# Patient Record
Sex: MALE | Race: WHITE | Employment: OTHER | URBAN - METROPOLITAN AREA
[De-identification: names, ages, dates, MRNs, and addresses within clinical notes are randomized per-mention and may not be internally consistent; named-entity substitution may affect disease eponyms.]

---

## 2018-12-18 ENCOUNTER — HOSPITAL ENCOUNTER (OUTPATIENT)
Age: 68
Discharge: HOME | End: 2018-12-18
Payer: MEDICARE

## 2018-12-18 LAB
BUN BLDV-MCNC: 14 MG/DL (ref 8–23)
CREAT SERPL-MCNC: 1.1 MG/DL (ref 0.7–1.2)
GFR AFRICAN AMERICAN: >60
GFR NON-AFRICAN AMERICAN: >60 ML/MIN/1.73

## 2018-12-18 PROCEDURE — 82565 ASSAY OF CREATININE: CPT

## 2018-12-18 PROCEDURE — 36415 COLL VENOUS BLD VENIPUNCTURE: CPT

## 2018-12-18 PROCEDURE — 84520 ASSAY OF UREA NITROGEN: CPT

## 2024-12-19 ENCOUNTER — APPOINTMENT (OUTPATIENT)
Dept: RADIOLOGY | Facility: HOSPITAL | Age: 74
End: 2024-12-19
Payer: MEDICARE

## 2024-12-19 ENCOUNTER — HOSPITAL ENCOUNTER (EMERGENCY)
Facility: HOSPITAL | Age: 74
Discharge: HOME | End: 2024-12-19
Attending: EMERGENCY MEDICINE
Payer: MEDICARE

## 2024-12-19 VITALS
WEIGHT: 250 LBS | HEIGHT: 70 IN | TEMPERATURE: 97.5 F | RESPIRATION RATE: 16 BRPM | DIASTOLIC BLOOD PRESSURE: 78 MMHG | BODY MASS INDEX: 35.79 KG/M2 | OXYGEN SATURATION: 100 % | HEART RATE: 78 BPM | SYSTOLIC BLOOD PRESSURE: 143 MMHG

## 2024-12-19 DIAGNOSIS — K92.2 GASTROINTESTINAL HEMORRHAGE, UNSPECIFIED GASTROINTESTINAL HEMORRHAGE TYPE: Primary | ICD-10-CM

## 2024-12-19 LAB
ABO GROUP (TYPE) IN BLOOD: NORMAL
ALBUMIN SERPL BCP-MCNC: 4 G/DL (ref 3.4–5)
ALP SERPL-CCNC: 98 U/L (ref 33–136)
ALT SERPL W P-5'-P-CCNC: 15 U/L (ref 10–52)
ANION GAP SERPL CALC-SCNC: 19 MMOL/L (ref 10–20)
ANTIBODY SCREEN: NORMAL
AST SERPL W P-5'-P-CCNC: 34 U/L (ref 9–39)
BASOPHILS # BLD AUTO: 0.02 X10*3/UL (ref 0–0.1)
BASOPHILS NFR BLD AUTO: 0.2 %
BILIRUB SERPL-MCNC: 0.8 MG/DL (ref 0–1.2)
BUN SERPL-MCNC: 22 MG/DL (ref 6–23)
CALCIUM SERPL-MCNC: 8.8 MG/DL (ref 8.6–10.3)
CHLORIDE SERPL-SCNC: 103 MMOL/L (ref 98–107)
CO2 SERPL-SCNC: 20 MMOL/L (ref 21–32)
CREAT SERPL-MCNC: 1.35 MG/DL (ref 0.5–1.3)
EGFRCR SERPLBLD CKD-EPI 2021: 55 ML/MIN/1.73M*2
EOSINOPHIL # BLD AUTO: 0 X10*3/UL (ref 0–0.4)
EOSINOPHIL NFR BLD AUTO: 0 %
ERYTHROCYTE [DISTWIDTH] IN BLOOD BY AUTOMATED COUNT: 13 % (ref 11.5–14.5)
GLUCOSE SERPL-MCNC: 75 MG/DL (ref 74–99)
HCT VFR BLD AUTO: 37.2 % (ref 41–52)
HCT VFR BLD AUTO: 38.8 % (ref 41–52)
HGB BLD-MCNC: 12.3 G/DL (ref 13.5–17.5)
HGB BLD-MCNC: 12.9 G/DL (ref 13.5–17.5)
IMM GRANULOCYTES # BLD AUTO: 0.03 X10*3/UL (ref 0–0.5)
IMM GRANULOCYTES NFR BLD AUTO: 0.3 % (ref 0–0.9)
LACTATE SERPL-SCNC: 0.8 MMOL/L (ref 0.4–2)
LYMPHOCYTES # BLD AUTO: 0.8 X10*3/UL (ref 0.8–3)
LYMPHOCYTES NFR BLD AUTO: 9.2 %
MCH RBC QN AUTO: 30.6 PG (ref 26–34)
MCHC RBC AUTO-ENTMCNC: 33.2 G/DL (ref 32–36)
MCV RBC AUTO: 92 FL (ref 80–100)
MONOCYTES # BLD AUTO: 0.57 X10*3/UL (ref 0.05–0.8)
MONOCYTES NFR BLD AUTO: 6.6 %
NEUTROPHILS # BLD AUTO: 7.26 X10*3/UL (ref 1.6–5.5)
NEUTROPHILS NFR BLD AUTO: 83.7 %
NRBC BLD-RTO: 0 /100 WBCS (ref 0–0)
PLATELET # BLD AUTO: 201 X10*3/UL (ref 150–450)
POTASSIUM SERPL-SCNC: 4.5 MMOL/L (ref 3.5–5.3)
PROT SERPL-MCNC: 7.1 G/DL (ref 6.4–8.2)
RBC # BLD AUTO: 4.22 X10*6/UL (ref 4.5–5.9)
RH FACTOR (ANTIGEN D): NORMAL
SODIUM SERPL-SCNC: 137 MMOL/L (ref 136–145)
WBC # BLD AUTO: 8.7 X10*3/UL (ref 4.4–11.3)

## 2024-12-19 PROCEDURE — 74174 CTA ABD&PLVS W/CONTRAST: CPT

## 2024-12-19 PROCEDURE — 83605 ASSAY OF LACTIC ACID: CPT | Performed by: EMERGENCY MEDICINE

## 2024-12-19 PROCEDURE — 96374 THER/PROPH/DIAG INJ IV PUSH: CPT | Mod: 59

## 2024-12-19 PROCEDURE — 85014 HEMATOCRIT: CPT | Mod: 59 | Performed by: EMERGENCY MEDICINE

## 2024-12-19 PROCEDURE — 85025 COMPLETE CBC W/AUTO DIFF WBC: CPT | Performed by: EMERGENCY MEDICINE

## 2024-12-19 PROCEDURE — 2550000001 HC RX 255 CONTRASTS: Performed by: EMERGENCY MEDICINE

## 2024-12-19 PROCEDURE — 86900 BLOOD TYPING SEROLOGIC ABO: CPT | Performed by: EMERGENCY MEDICINE

## 2024-12-19 PROCEDURE — 99285 EMERGENCY DEPT VISIT HI MDM: CPT | Mod: 25 | Performed by: EMERGENCY MEDICINE

## 2024-12-19 PROCEDURE — 2500000004 HC RX 250 GENERAL PHARMACY W/ HCPCS (ALT 636 FOR OP/ED): Performed by: EMERGENCY MEDICINE

## 2024-12-19 PROCEDURE — 80053 COMPREHEN METABOLIC PANEL: CPT | Performed by: EMERGENCY MEDICINE

## 2024-12-19 PROCEDURE — 36415 COLL VENOUS BLD VENIPUNCTURE: CPT | Performed by: EMERGENCY MEDICINE

## 2024-12-19 PROCEDURE — 74174 CTA ABD&PLVS W/CONTRAST: CPT | Performed by: RADIOLOGY

## 2024-12-19 RX ORDER — BISMUTH SUBSALICYLATE 262 MG
1 TABLET,CHEWABLE ORAL DAILY
COMMUNITY

## 2024-12-19 RX ORDER — OMEPRAZOLE 40 MG/1
40 CAPSULE, DELAYED RELEASE ORAL EVERY MORNING
COMMUNITY

## 2024-12-19 RX ORDER — PANTOPRAZOLE SODIUM 40 MG/10ML
80 INJECTION, POWDER, LYOPHILIZED, FOR SOLUTION INTRAVENOUS ONCE
Status: COMPLETED | OUTPATIENT
Start: 2024-12-19 | End: 2024-12-19

## 2024-12-19 RX ORDER — TAMSULOSIN HYDROCHLORIDE 0.4 MG/1
0.8 CAPSULE ORAL DAILY
COMMUNITY

## 2024-12-19 RX ORDER — HYDROCORTISONE 25 MG/G
1 CREAM TOPICAL 2 TIMES DAILY
Qty: 6 G | Refills: 0 | Status: SHIPPED | OUTPATIENT
Start: 2024-12-19 | End: 2025-01-18

## 2024-12-19 RX ORDER — VIT C/E/ZN/COPPR/LUTEIN/ZEAXAN 250MG-90MG
25 CAPSULE ORAL DAILY
COMMUNITY

## 2024-12-19 RX ORDER — DILTIAZEM HYDROCHLORIDE 240 MG/1
240 CAPSULE, COATED, EXTENDED RELEASE ORAL DAILY
COMMUNITY

## 2024-12-19 RX ADMIN — IOHEXOL 90 ML: 350 INJECTION, SOLUTION INTRAVENOUS at 12:52

## 2024-12-19 RX ADMIN — PANTOPRAZOLE SODIUM 80 MG: 40 INJECTION, POWDER, FOR SOLUTION INTRAVENOUS at 11:26

## 2024-12-19 ASSESSMENT — COLUMBIA-SUICIDE SEVERITY RATING SCALE - C-SSRS
2. HAVE YOU ACTUALLY HAD ANY THOUGHTS OF KILLING YOURSELF?: NO
1. IN THE PAST MONTH, HAVE YOU WISHED YOU WERE DEAD OR WISHED YOU COULD GO TO SLEEP AND NOT WAKE UP?: NO
6. HAVE YOU EVER DONE ANYTHING, STARTED TO DO ANYTHING, OR PREPARED TO DO ANYTHING TO END YOUR LIFE?: NO

## 2024-12-19 ASSESSMENT — PAIN DESCRIPTION - DESCRIPTORS: DESCRIPTORS: ACHING

## 2024-12-19 ASSESSMENT — LIFESTYLE VARIABLES
HAVE YOU EVER FELT YOU SHOULD CUT DOWN ON YOUR DRINKING: NO
EVER FELT BAD OR GUILTY ABOUT YOUR DRINKING: NO
HAVE PEOPLE ANNOYED YOU BY CRITICIZING YOUR DRINKING: NO
EVER HAD A DRINK FIRST THING IN THE MORNING TO STEADY YOUR NERVES TO GET RID OF A HANGOVER: NO
TOTAL SCORE: 0

## 2024-12-19 ASSESSMENT — PAIN DESCRIPTION - PAIN TYPE: TYPE: ACUTE PAIN

## 2024-12-19 ASSESSMENT — PAIN SCALES - GENERAL: PAINLEVEL_OUTOF10: 4

## 2024-12-19 ASSESSMENT — PAIN - FUNCTIONAL ASSESSMENT: PAIN_FUNCTIONAL_ASSESSMENT: 0-10

## 2024-12-19 ASSESSMENT — PAIN DESCRIPTION - LOCATION: LOCATION: ABDOMEN

## 2024-12-19 ASSESSMENT — ACTIVITIES OF DAILY LIVING (ADL): LACK_OF_TRANSPORTATION: NO

## 2024-12-19 NOTE — PROGRESS NOTES
Pharmacy Medication History Review    Phan Gandara is a 74 y.o. male admitted for No Principal Problem: There is no principal problem currently on the Problem List. Please update the Problem List and refresh.. Pharmacy reviewed the patient's [id number] medications and allergies for accuracy.    The list below reflectives the updated PTA list. Please review each medication in order reconciliation for additional clarification and justification.  Prior to Admission Medications   Prescriptions Last Dose Informant Patient Reported? Taking?   CYANOCOBALAMIN, VITAMIN B-12, ORAL Past Week Self Yes Yes   Sig: Place 1 tablet under the tongue once daily.   cholecalciferol (Vitamin D-3) 25 MCG (1000 UT) capsule Past Week Self Yes Yes   Sig: Take 1 capsule (25 mcg) by mouth once daily.   dilTIAZem CD (Cardizem CD) 240 mg 24 hr capsule 12/19/2024 Morning Self Yes Yes   Sig: Take 1 capsule (240 mg) by mouth once daily.   lubricating eye drops ophthalmic solution 12/18/2024 Evening Self Yes Yes   Sig: Administer 1 drop into both eyes 4 times a day as needed for dry eyes.   multivitamin tablet Past Week Self Yes Yes   Sig: Take 1 tablet by mouth once daily.   omeprazole (PriLOSEC) 40 mg DR capsule 12/19/2024 Morning Self Yes Yes   Sig: Take 1 capsule (40 mg) by mouth once daily in the morning. Do not crush or chew.   tamsulosin (Flomax) 0.4 mg 24 hr capsule 12/18/2024 Evening Self Yes Yes   Sig: Take 2 capsules (0.8 mg) by mouth once daily.      Facility-Administered Medications: None           The list below reflectives the updated allergy list. Please review each documented allergy for additional clarification and justification.  Allergies  Reviewed by Noah Benavidez RN on 12/19/2024   No Known Allergies         Below are additional concerns with the patient's PTA list.      Christy Cerna

## 2024-12-19 NOTE — PROGRESS NOTES
12/19/24 1447   Discharge Planning   Living Arrangements Alone  (sister lives close)   Support Systems Family members   Assistance Needed A&OX4; independent with ADLs with no DME; drives; room air baseline and currently room air; PCP Dr Murry   Type of Residence Private residence   Number of Stairs to Enter Residence 5   Number of Stairs Within Residence 14   Do you have animals or pets at home? Yes   Type of Animals or Pets 1 dog   Who is requesting discharge planning? Provider   Home or Post Acute Services None   Expected Discharge Disposition Home  (Home no needs. Patient to follow up at outpatient lab for repeat labs. Pt to follow up with PCP ASAP. Pt to call and arrange. Currently has 1/20/25 with his PCP)   Does the patient need discharge transport arranged? No   Financial Resource Strain   How hard is it for you to pay for the very basics like food, housing, medical care, and heating? Not hard   Housing Stability   In the last 12 months, was there a time when you were not able to pay the mortgage or rent on time? N   In the past 12 months, how many times have you moved where you were living? 0   At any time in the past 12 months, were you homeless or living in a shelter (including now)? N   Transportation Needs   In the past 12 months, has lack of transportation kept you from medical appointments or from getting medications? no   In the past 12 months, has lack of transportation kept you from meetings, work, or from getting things needed for daily living? No   Intensity of Service   Intensity of Service 0-30 min     12/19/2024 1452pm  Spoke with patient bedside in ED

## 2024-12-19 NOTE — ED PROVIDER NOTES
HPI   Chief Complaint   Patient presents with    Black or Bloody Stool       74-year-old male from home for chief complaint of GI bleeding.  Patient states on Monday he had black and maroon stool with no belly pain.  He saw his doctor Tuesday morning who told him do a clear liquid diet for 24 hours and see what happens.  He states that let up and then again this morning had a bunch of black tarry stool.  He states this happened once 5 years ago when he was hospitalized down at [facility], they could not find the source of the bleed.  He does not take NSAIDs he does not take blood thinners.  He denies any dizziness lightheadedness chest pain or shortness of breath.                  Patient History   History reviewed. No pertinent past medical history.  History reviewed. No pertinent surgical history.  No family history on file.  Social History     Tobacco Use    Smoking status: Never    Smokeless tobacco: Never   Substance Use Topics    Alcohol use: Not Currently    Drug use: Never       Physical Exam   ED Triage Vitals [12/19/24 1041]   Temperature Heart Rate Respirations BP   36.4 °C (97.5 °F) 81 20 162/90      Pulse Ox Temp Source Heart Rate Source Patient Position   95 % Temporal Monitor --      BP Location FiO2 (%)     -- --       Physical Exam  Vitals and nursing note reviewed.   Constitutional:       Appearance: Normal appearance.   HENT:      Head: Normocephalic and atraumatic.      Nose: Nose normal.      Mouth/Throat:      Mouth: Mucous membranes are moist.   Eyes:      Extraocular Movements: Extraocular movements intact.      Pupils: Pupils are equal, round, and reactive to light.   Cardiovascular:      Rate and Rhythm: Normal rate and regular rhythm.   Pulmonary:      Effort: Pulmonary effort is normal.      Breath sounds: Normal breath sounds.   Abdominal:      General: Abdomen is flat.      Palpations: Abdomen is soft.   Musculoskeletal:         General: Normal range of motion.      Cervical back: Normal  range of motion.   Skin:     General: Skin is warm and dry.      Capillary Refill: Capillary refill takes less than 2 seconds.   Neurological:      General: No focal deficit present.      Mental Status: He is alert.   Psychiatric:         Mood and Affect: Mood normal.           ED Course & MDM   ED Course as of 12/26/24 2237   u Dec 19, 2024   1136 HEMOGLOBIN(!): 12.9 [TP]   1136 HEMATOCRIT(!): 38.8 [TP]      ED Course User Index  [TP] Tamika De Los Santos DO         Diagnoses as of 12/26/24 2237   Gastrointestinal hemorrhage, unspecified gastrointestinal hemorrhage type                 No data recorded     [place] Coma Scale Score: 15 (12/19/24 1042 : Noah Benavidez RN)                           Medical Decision Making  Medical Decision Making: Patient was worked up and has a hemoglobin of 12.9.  Rest of the lab work is at baseline, CT angiogram does not show any evidence of GI bleeding.  We did have the GI nurse practitioner come down to see the patient.  At this time we do feel this is most likely hemorrhoidal in nature.  Patient does feel comfortable going home with very close follow-up.  He does agree the plan of Care.  Did encourage a high-fiber diet at this time.  [unfilled]   Differential includes GI bleed upper GI bleed lower hemorrhoids anal fissure  Consider repeat H&H which was done, hemoglobin is stable at this time.  Tamika De Los Santos D.O.  Emergency Medicine          Procedure  Procedures     Tamika De Los Santos DO  12/26/24 2238

## 2024-12-19 NOTE — ED TRIAGE NOTES
Patient c/o bloody stools for the past 4 days, patient states it started out has bright red bloody diarrhea, last night his stools became dark black. Patient states he's had some lower abdominal pain and cramping, denies nausea or vomiting, patient is not on blood thinners.

## 2025-04-25 ENCOUNTER — APPOINTMENT (OUTPATIENT)
Dept: RADIOLOGY | Facility: HOSPITAL | Age: 75
End: 2025-04-25
Payer: MEDICARE

## 2025-04-25 ENCOUNTER — HOSPITAL ENCOUNTER (EMERGENCY)
Facility: HOSPITAL | Age: 75
Discharge: HOME | End: 2025-04-25
Attending: STUDENT IN AN ORGANIZED HEALTH CARE EDUCATION/TRAINING PROGRAM
Payer: MEDICARE

## 2025-04-25 VITALS
WEIGHT: 250 LBS | SYSTOLIC BLOOD PRESSURE: 156 MMHG | BODY MASS INDEX: 35.79 KG/M2 | TEMPERATURE: 97.2 F | HEART RATE: 58 BPM | RESPIRATION RATE: 18 BRPM | OXYGEN SATURATION: 98 % | DIASTOLIC BLOOD PRESSURE: 88 MMHG | HEIGHT: 70 IN

## 2025-04-25 DIAGNOSIS — S67.10XD CRUSHING INJURY OF FINGER, SUBSEQUENT ENCOUNTER: Primary | ICD-10-CM

## 2025-04-25 PROCEDURE — 99283 EMERGENCY DEPT VISIT LOW MDM: CPT | Performed by: STUDENT IN AN ORGANIZED HEALTH CARE EDUCATION/TRAINING PROGRAM

## 2025-04-25 PROCEDURE — 73130 X-RAY EXAM OF HAND: CPT | Mod: LEFT SIDE | Performed by: RADIOLOGY

## 2025-04-25 PROCEDURE — 73130 X-RAY EXAM OF HAND: CPT | Mod: LT

## 2025-04-25 ASSESSMENT — PAIN DESCRIPTION - PAIN TYPE: TYPE: ACUTE PAIN

## 2025-04-25 ASSESSMENT — PAIN DESCRIPTION - LOCATION: LOCATION: FINGER (COMMENT WHICH ONE)

## 2025-04-25 ASSESSMENT — LIFESTYLE VARIABLES
EVER HAD A DRINK FIRST THING IN THE MORNING TO STEADY YOUR NERVES TO GET RID OF A HANGOVER: NO
HAVE YOU EVER FELT YOU SHOULD CUT DOWN ON YOUR DRINKING: NO
TOTAL SCORE: 0
EVER FELT BAD OR GUILTY ABOUT YOUR DRINKING: NO
HAVE PEOPLE ANNOYED YOU BY CRITICIZING YOUR DRINKING: NO

## 2025-04-25 ASSESSMENT — COLUMBIA-SUICIDE SEVERITY RATING SCALE - C-SSRS
1. IN THE PAST MONTH, HAVE YOU WISHED YOU WERE DEAD OR WISHED YOU COULD GO TO SLEEP AND NOT WAKE UP?: NO
6. HAVE YOU EVER DONE ANYTHING, STARTED TO DO ANYTHING, OR PREPARED TO DO ANYTHING TO END YOUR LIFE?: NO
2. HAVE YOU ACTUALLY HAD ANY THOUGHTS OF KILLING YOURSELF?: NO

## 2025-04-25 ASSESSMENT — PAIN DESCRIPTION - ORIENTATION: ORIENTATION: LEFT

## 2025-04-25 ASSESSMENT — PAIN - FUNCTIONAL ASSESSMENT: PAIN_FUNCTIONAL_ASSESSMENT: 0-10

## 2025-04-25 ASSESSMENT — PAIN SCALES - GENERAL
PAINLEVEL_OUTOF10: 5 - MODERATE PAIN
PAINLEVEL_OUTOF10: 0 - NO PAIN
PAINLEVEL_OUTOF10: 4

## 2025-04-25 NOTE — ED TRIAGE NOTES
Pt presented to the ED with c/o wound check. Pt injured his finger yesterday and was seen at an urgent care and given a tetanus shot, abx and sutured the finger. Urgent care advised him to come back today for a dressing change. When looking at the finger they had concerns it was infected and sent to ED for wound check.

## 2025-04-25 NOTE — ED PROVIDER NOTES
HPI   Chief Complaint   Patient presents with    Wound Check       This is a 75-year-old male right-hand-dominant presenting for evaluation of left ring finger injury that occurred yesterday when his finger was crushed by a pallet of railroad ties.  He was seen at urgent care and they repaired the finger and did x-rays and gave him a tetanus shot and put him on Keflex.  He was reevaluated there today who advised him to come in here due to decreased range of motion in the affected finger.  Denies any fevers chills systemic symptoms.      History provided by:  Patient   used: No            Patient History   Medical History[1]  Surgical History[2]  Family History[3]  Social History[4]    Physical Exam   ED Triage Vitals [04/25/25 1719]   Temperature Heart Rate Respirations BP   36.2 °C (97.2 °F) 55 18 176/83      Pulse Ox Temp Source Heart Rate Source Patient Position   97 % Temporal Monitor Sitting      BP Location FiO2 (%)     -- --       Physical Exam    General: Vitals noted, no distress. Afebrile  Cardiac: Regular rate and rhythm. No murmur.  Pulmonary: Lungs clear bilaterally with good aeration. No adventitious breath sounds.   Extremities: Exam of the left hand shows repaired laceration with sutures and Steri-Strips on the palmar aspect of the left ring finger there is no dehiscence.  No purulence.  Not held in passive flexion..  Has slightly diminished range of motion actively and passively with flexion.  No pain out of proportion.  No crepitus.  No warmth erythema.  No streaking.  No pain with passive extension.  No tenderness of the flexor tendon sheath.  The skin is intact. Is neurovascularly intact distally. Specifically, has full strength with flexion and extension of the digits. Is nontender over the wrist. Remainder the extremity is nontender.     ED Course & MDM   Diagnoses as of 04/25/25 1852   Crushing injury of finger, subsequent encounter                 No data recorded      [place] Coma Scale Score: 15 (04/25/25 7608 : Iliana Pozo, VALORIE)                           Medical Decision Making  X-ray was repeated today and did not show any acute osseous injury per my independent review.  Has no purulence, no fevers, no redness, no streaking, no tenderness of the flexor tendon sheath, no pain out of proportion.  I have low suspicion for infectious tenosynovitis.  I have low suspicion for abscess.  He has no infectious symptoms.  I think his diminished range of motion is consistent with the recency of a crush injury.  His flexor and extensor strength is intact and there is no obvious sign of tendon injury.  Xeroform and bandage was applied by paramedic and the patient was given hand orthopedic referral and advised to continue Keflex and was given strict return precautions.  This visit was staffed with the attending physician Dr. Hernandez.      Disclaimer: This note was dictated using speech recognition software. An attempt at proofreading was made to minimize errors. Minor errors in transcription may be present.    Amount and/or Complexity of Data Reviewed  Radiology: ordered.        Procedure  Procedures       [1] History reviewed. No pertinent past medical history.  [2] History reviewed. No pertinent surgical history.  [3] No family history on file.  [4]   Social History  Tobacco Use    Smoking status: Never    Smokeless tobacco: Never   Substance Use Topics    Alcohol use: Not Currently    Drug use: Never        Silvino Harman PA-C  04/25/25 6236

## 2025-05-01 DIAGNOSIS — G62.9 NEUROPATHY: Primary | ICD-10-CM

## 2025-05-05 ENCOUNTER — APPOINTMENT (OUTPATIENT)
Dept: ORTHOPEDIC SURGERY | Facility: CLINIC | Age: 75
End: 2025-05-05
Payer: MEDICARE

## 2025-05-05 DIAGNOSIS — S61.219A FINGER LACERATION, INITIAL ENCOUNTER: Primary | ICD-10-CM

## 2025-05-05 PROCEDURE — 1159F MED LIST DOCD IN RCRD: CPT | Performed by: ORTHOPAEDIC SURGERY

## 2025-05-05 PROCEDURE — 1036F TOBACCO NON-USER: CPT | Performed by: ORTHOPAEDIC SURGERY

## 2025-05-05 PROCEDURE — 15853 REMOVAL SUTR/STAPL XREQ ANES: CPT | Performed by: ORTHOPAEDIC SURGERY

## 2025-05-05 PROCEDURE — 99203 OFFICE O/P NEW LOW 30 MIN: CPT | Performed by: ORTHOPAEDIC SURGERY

## 2025-05-05 ASSESSMENT — PAIN - FUNCTIONAL ASSESSMENT: PAIN_FUNCTIONAL_ASSESSMENT: NO/DENIES PAIN

## 2025-05-06 NOTE — PROGRESS NOTES
History of Present Illness:  Chief Complaint   Patient presents with    Left Hand - Pain     Left ring finger pain      75-year-old male presents for evaluation of left ring finger injury that occurred 2025.  He was moving some railroad ties when he smashed his left ring finger.  He sustained a laceration along the radial aspect of his ring finger as well as volarly.  The radial laceration was washed out and repaired with nonabsorbable sutures.  Referred for further treatment.  He feels like motion has been improving.  Some diminished sensation into tip of ring finger.    Medical History[1]    Medication Documentation Review Audit       Reviewed by Rosio Hernandez CMA (Medical Assistant) on 25 at 0933      Medication Order Taking? Sig Documenting Provider Last Dose Status   cholecalciferol (Vitamin D-3) 25 MCG (1000 UT) capsule [id number] No Take 1 capsule (25 mcg) by mouth once daily. Marry Morales MD Past Week Active   CYANOCOBALAMIN, VITAMIN B-12, ORAL [id number] No Place 1 tablet under the tongue once daily. Marry Morales MD Past Week Active   dilTIAZem CD (Cardizem CD) 240 mg 24 hr capsule [id number] No Take 1 capsule (240 mg) by mouth once daily. Marry Morales MD 2024 Morning Active   hydrocortisone (Anusol-HC) 2.5 % rectal cream [id number]  Insert 1 Application into the rectum 2 times a day. Apply rectally 2 times daily Tamika De Los Santos, DO   25 2486   lubricating eye drops ophthalmic solution [id number] No Administer 1 drop into both eyes 4 times a day as needed for dry eyes. Marry Morales MD 2024 Evening Active   multivitamin tablet [id number] No Take 1 tablet by mouth once daily. Marry Morales MD Past Week Active   omeprazole (PriLOSEC) 40 mg DR capsule [id number] No Take 1 capsule (40 mg) by mouth once daily in the morning. Do not crush or chew. Marry Morales MD 2024 Morning Active   tamsulosin (Flomax) 0.4 mg 24 hr  capsule [id number] No Take 2 capsules (0.8 mg) by mouth once daily. Historical Provider, MD 12/18/2024 Evening Active                    RX Allergies[2]    Social History     Socioeconomic History    Marital status:      Spouse name: Not on file    Number of children: Not on file    Years of education: Not on file    Highest education level: Not on file   Occupational History    Not on file   Tobacco Use    Smoking status: Never    Smokeless tobacco: Never   Substance and Sexual Activity    Alcohol use: Not Currently    Drug use: Never    Sexual activity: Not on file   Other Topics Concern    Not on file   Social History Narrative    Not on file     Social Drivers of Health     Financial Resource Strain: Low Risk  (12/19/2024)    Overall Financial Resource Strain (CARDIA)     Difficulty of Paying Living Expenses: Not hard at all   Food Insecurity: Not on file   Transportation Needs: No Transportation Needs (12/19/2024)    PRAPARE - Transportation     Lack of Transportation (Medical): No     Lack of Transportation (Non-Medical): No   Physical Activity: Not on file   Stress: Not on file   Social Connections: Not on file   Intimate Partner Violence: Not on file   Housing Stability: Low Risk  (12/19/2024)    Housing Stability Vital Sign     Unable to Pay for Housing in the Last Year: No     Number of Times Moved in the Last Year: 0     Homeless in the Last Year: No       Surgical History[3]     Review of Systems   GENERAL: Negative for malaise, significant weight loss, fever  MUSCULOSKELETAL: see HPI  NEURO:  Negative     Physical Examination  Constitutional: Appears well-developed and well-nourished.  Head: Normocephalic and atraumatic.  Eyes: EOMI grossly  Cardiovascular: Intact distal pulses.   Respiratory: Effort normal. No respiratory distress.  Neurologic: Alert and oriented to person, place, and time.  Skin: Skin is warm and dry.  Hematologic / Lymphatic: No lymphedema, lymphangitis.  Psychiatric: normal  mood and affect. Behavior is normal.   Musculoskeletal:  Left ring finger: 0.5 cm DPC.  FDS/FDP function grossly intact.  No scissoring/malrotation.  Well-healing laceration about the radial aspect of the digit near the level of the DIP joint.  Sutures removed uneventfully.  No gross signs of infection.  Slightly diminished sensation at tip of digit.    Radiographs: Left hand radiographs from April 25, 2025 available for my review/interpretation: No fracture/dislocation.  Joint spaces grossly maintained.    Assessment:  Patient with left ring finger crush injury with associated laceration     Plan:  Nature of the diagnosis was discussed with the patient.  Sutures removed uneventfully.  We did discuss potential for long-term diminished sensation given nature of injury as well as potential treatment options.  Patient will begin with scar tissue massage now that sutures are removed.  If he does have persistent sensitivity or any other issues/concerns he will call the office for further follow-up.                [1] History reviewed. No pertinent past medical history.  [2] No Known Allergies  [3] History reviewed. No pertinent surgical history.

## 2025-05-15 ENCOUNTER — PROCEDURE VISIT (OUTPATIENT)
Dept: PHYSICAL MEDICINE AND REHAB | Age: 75
End: 2025-05-15

## 2025-05-15 VITALS — HEIGHT: 70 IN | WEIGHT: 248 LBS | BODY MASS INDEX: 35.5 KG/M2

## 2025-05-15 DIAGNOSIS — M54.17 RADICULOPATHY, LUMBOSACRAL REGION: Primary | ICD-10-CM

## 2025-05-15 NOTE — PROGRESS NOTES
[facility]  Electrodiagnostic Laboratory  *Accredited by the [facility] with exemplary status  [address] [place] [address]  Phone: [phone]  Fax: [phone]    Referring Provider: Jamaal Mejia DPM  Primary Care Physician: Issa Park MD  Patient Name: Narayan Tomlin  Patient YOB: 1950  Gender: male  BMI: Body mass index is 35.58 kg/m².  Height 1.778 m (5' 10\"), weight 112.5 kg (248 lb).    5/15/2025    Reason for Referral: Neuropathy     Description of clinical problem:   Chief Complaint   Patient presents with    Extremity Pain     None    Numbness     Numbness/tingling in the bottom of the feet. Getting worse in the last year. It is worse in the morning. Both sides feel the same.     Extremity Weakness     Balance is a off due to numbness/tingling.        Brief physical exam:   Sensory deficit Yes; Weakness No; Atrophy  No; Reflexes absent     Sensory NCS      Nerve / Sites Rec. Site Peak Lat PP Amp Segments Distance Velocity Temp.     ms µV  cm m/s °C   L Sural - Ankle (Calf)      Calf Ankle 3.44 6.7 Calf - Ankle 14 57 31.9   L Superficial peroneal - Ankle      Lat leg Ankle 3.39 6.0 Lat leg - Ankle 10 40 31.8       Motor NCS      Nerve / Sites Muscle Onset Amplitude Segments Distance Velocity Temp.     ms mV  cm m/s °C   L Peroneal - EDB      Ankle EDB 4.06 1.4 Ankle - EDB 8  31.9      Fib head EDB 13.02 0.7 Fib head - Ankle 34 38 31.9      Above Knee EDB 15.21 0.7 Above Knee - Fib head 10 46 31.8   L Tibial - AH      Ankle AH 5.99 3.9 Ankle - AH 8  31.8      Pop fossa AH 15.83 2.2 Pop fossa - Ankle 36 37 31.8       F Wave      Nerve Fmin % F    ms %   L Peroneal - EDB 55.16 100   L Tibial - AH 55.42 50       H Reflex      Nerve H Lat    ms   L Tibial - Soleus 37.76   R Tibial - Soleus 38.39       EMG      EMG (Multi-channel)      EMG Summary Table     Spontaneous MUAP Recruitment   Muscle Nerve Roots IA Fib PSW Fasc Amp Dur. PPP Pattern   R.

## 2025-05-16 DIAGNOSIS — G62.9 NEUROPATHY: ICD-10-CM

## 2025-05-30 ENCOUNTER — APPOINTMENT (OUTPATIENT)
Dept: RADIOLOGY | Facility: HOSPITAL | Age: 75
End: 2025-05-30
Payer: MEDICARE

## 2025-05-30 ENCOUNTER — HOSPITAL ENCOUNTER (EMERGENCY)
Facility: HOSPITAL | Age: 75
Discharge: HOME | End: 2025-05-30
Payer: MEDICARE

## 2025-05-30 VITALS
BODY MASS INDEX: 35.36 KG/M2 | HEIGHT: 70 IN | SYSTOLIC BLOOD PRESSURE: 142 MMHG | WEIGHT: 247 LBS | TEMPERATURE: 97.7 F | OXYGEN SATURATION: 99 % | RESPIRATION RATE: 16 BRPM | HEART RATE: 63 BPM | DIASTOLIC BLOOD PRESSURE: 84 MMHG

## 2025-05-30 DIAGNOSIS — S61.315A LACERATION OF LEFT RING FINGER WITHOUT FOREIGN BODY WITH DAMAGE TO NAIL, INITIAL ENCOUNTER: Primary | ICD-10-CM

## 2025-05-30 DIAGNOSIS — S61.311A LACERATION OF LEFT INDEX FINGER WITHOUT FOREIGN BODY WITH DAMAGE TO NAIL, INITIAL ENCOUNTER: ICD-10-CM

## 2025-05-30 PROCEDURE — 12002 RPR S/N/AX/GEN/TRNK2.6-7.5CM: CPT

## 2025-05-30 PROCEDURE — 2500000004 HC RX 250 GENERAL PHARMACY W/ HCPCS (ALT 636 FOR OP/ED): Performed by: PHYSICIAN ASSISTANT

## 2025-05-30 PROCEDURE — 73130 X-RAY EXAM OF HAND: CPT | Mod: LEFT SIDE | Performed by: STUDENT IN AN ORGANIZED HEALTH CARE EDUCATION/TRAINING PROGRAM

## 2025-05-30 PROCEDURE — 99283 EMERGENCY DEPT VISIT LOW MDM: CPT

## 2025-05-30 PROCEDURE — 12042 INTMD RPR N-HF/GENIT2.6-7.5: CPT | Performed by: PHYSICIAN ASSISTANT

## 2025-05-30 PROCEDURE — 73130 X-RAY EXAM OF HAND: CPT | Mod: LT

## 2025-05-30 RX ORDER — LIDOCAINE HYDROCHLORIDE 10 MG/ML
10 INJECTION, SOLUTION INFILTRATION; PERINEURAL ONCE
Status: COMPLETED | OUTPATIENT
Start: 2025-05-30 | End: 2025-05-30

## 2025-05-30 RX ADMIN — LIDOCAINE HYDROCHLORIDE 10 ML: 10 INJECTION, SOLUTION INFILTRATION; PERINEURAL at 17:03

## 2025-05-30 ASSESSMENT — LIFESTYLE VARIABLES
EVER FELT BAD OR GUILTY ABOUT YOUR DRINKING: NO
TOTAL SCORE: 0
HAVE YOU EVER FELT YOU SHOULD CUT DOWN ON YOUR DRINKING: NO
EVER HAD A DRINK FIRST THING IN THE MORNING TO STEADY YOUR NERVES TO GET RID OF A HANGOVER: NO
HAVE PEOPLE ANNOYED YOU BY CRITICIZING YOUR DRINKING: NO

## 2025-05-30 ASSESSMENT — PAIN DESCRIPTION - DESCRIPTORS: DESCRIPTORS: THROBBING

## 2025-05-30 ASSESSMENT — PAIN - FUNCTIONAL ASSESSMENT
PAIN_FUNCTIONAL_ASSESSMENT: 0-10
PAIN_FUNCTIONAL_ASSESSMENT: 0-10

## 2025-05-30 ASSESSMENT — PAIN DESCRIPTION - FREQUENCY: FREQUENCY: CONSTANT/CONTINUOUS

## 2025-05-30 ASSESSMENT — PAIN DESCRIPTION - ORIENTATION: ORIENTATION: LEFT

## 2025-05-30 ASSESSMENT — PAIN DESCRIPTION - PAIN TYPE: TYPE: ACUTE PAIN

## 2025-05-30 ASSESSMENT — PAIN SCALES - GENERAL
PAINLEVEL_OUTOF10: 8
PAINLEVEL_OUTOF10: 0 - NO PAIN
PAINLEVEL_OUTOF10: 0 - NO PAIN

## 2025-05-30 ASSESSMENT — PAIN DESCRIPTION - ONSET: ONSET: SUDDEN

## 2025-05-30 ASSESSMENT — PAIN DESCRIPTION - LOCATION: LOCATION: FINGER (COMMENT WHICH ONE)

## 2025-05-30 NOTE — ED TRIAGE NOTES
Pt arrived through triage from home. Pt was using a /saw of some sort and cut his pointer and ring finger on his left finger. Bleeding is controlled. Tetanus is up to date from another injury a month ago. Pt is not on blood thinners.

## 2025-05-31 NOTE — ED PROCEDURE NOTE
Procedure  Laceration Repair    Performed by: Zane Guo PA-C  Authorized by: Zane Guo PA-C    Consent:     Consent obtained:  Verbal    Consent given by:  Patient  Universal protocol:     Patient identity confirmed:  Verbally with patient, arm band and provided demographic data  Anesthesia:     Anesthesia method:  None  Laceration details:     Location:  Finger    Finger location:  L index finger    Length (cm):  0.5    Depth (mm):  5  Exploration:     Limited defect created (wound extended): no      Contaminated: no    Treatment:     Area cleansed with:  Chlorhexidine and saline    Debridement:  None    Undermining:  None    Scar revision: no    Skin repair:     Repair method:  Tissue adhesive  Approximation:     Approximation:  Close  Repair type:     Repair type:  Simple  Post-procedure details:     Dressing:  Open (no dressing)    Procedure completion:  Tolerated               Zane Guo PA-C  05/31/25 5999
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
## 2025-05-31 NOTE — ED PROCEDURE NOTE
Procedure  Laceration Repair    Performed by: Zane Guo PA-C  Authorized by: Zane Guo PA-C    Consent:     Consent obtained:  Verbal  Universal protocol:     Patient identity confirmed:  Verbally with patient, arm band and provided demographic data  Anesthesia:     Anesthesia method:  Nerve block    Block location:  Finger block    Block needle gauge:  25 G    Block anesthetic:  Lidocaine 1% w/o epi    Block technique:  Digital    Block injection procedure:  Anatomic landmarks palpated, anatomic landmarks identified, introduced needle, negative aspiration for blood and incremental injection    Block outcome:  Anesthesia achieved  Laceration details:     Location:  Finger    Finger location:  L long finger    Length (cm):  3.5    Depth (mm):  12  Exploration:     Contaminated: no    Treatment:     Area cleansed with:  Chlorhexidine and saline    Amount of cleaning:  Standard    Visualized foreign bodies/material removed: no      Debridement:  None    Undermining:  None    Scar revision: no    Skin repair:     Repair method:  Sutures    Suture size:  4-0    Suture material:  Fast-absorbing gut    Suture technique:  Simple interrupted    Number of sutures:  3  Approximation:     Approximation:  Close  Repair type:     Repair type:  Intermediate  Post-procedure details:     Dressing:  Open (no dressing)    Procedure completion:  Tolerated               Zane Guo PA-C  05/31/25 1829       Zane Guo PA-C  05/31/25 1835

## 2025-06-09 ENCOUNTER — APPOINTMENT (OUTPATIENT)
Dept: ORTHOPEDIC SURGERY | Facility: CLINIC | Age: 75
End: 2025-06-09
Payer: MEDICARE

## 2025-06-09 DIAGNOSIS — S61.219A FINGER LACERATION, INITIAL ENCOUNTER: Primary | ICD-10-CM

## 2025-06-09 PROCEDURE — 99213 OFFICE O/P EST LOW 20 MIN: CPT | Performed by: ORTHOPAEDIC SURGERY

## 2025-06-09 PROCEDURE — 1036F TOBACCO NON-USER: CPT | Performed by: ORTHOPAEDIC SURGERY

## 2025-06-09 PROCEDURE — 1159F MED LIST DOCD IN RCRD: CPT | Performed by: ORTHOPAEDIC SURGERY

## 2025-06-09 ASSESSMENT — PAIN - FUNCTIONAL ASSESSMENT: PAIN_FUNCTIONAL_ASSESSMENT: NO/DENIES PAIN
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
